# Patient Record
Sex: MALE | Race: WHITE | NOT HISPANIC OR LATINO | Employment: OTHER | URBAN - METROPOLITAN AREA
[De-identification: names, ages, dates, MRNs, and addresses within clinical notes are randomized per-mention and may not be internally consistent; named-entity substitution may affect disease eponyms.]

---

## 2023-01-24 PROBLEM — R10.9 ABDOMINAL PAIN, ACUTE: Status: ACTIVE | Noted: 2023-01-24

## 2023-01-24 PROBLEM — N39.43 BENIGN PROSTATIC HYPERPLASIA (BPH) WITH POST-VOID DRIBBLING: Status: ACTIVE | Noted: 2023-01-24

## 2023-01-24 PROBLEM — K21.9 GERD (GASTROESOPHAGEAL REFLUX DISEASE): Status: ACTIVE | Noted: 2023-01-24

## 2023-01-24 PROBLEM — E78.1 HIGH TRIGLYCERIDES: Status: ACTIVE | Noted: 2023-01-24

## 2023-01-24 PROBLEM — R09.89 CHOKING EPISODE: Status: ACTIVE | Noted: 2023-01-24

## 2023-01-24 PROBLEM — E78.5 HYPERLIPIDEMIA: Status: ACTIVE | Noted: 2023-01-24

## 2023-01-24 PROBLEM — R05.8 RECURRENT COUGH: Status: ACTIVE | Noted: 2023-01-24

## 2023-01-24 PROBLEM — R91.8 MULTIPLE PULMONARY NODULES: Status: ACTIVE | Noted: 2023-01-24
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
## 2023-01-24 PROBLEM — N40.1 BENIGN PROSTATIC HYPERPLASIA (BPH) WITH POST-VOID DRIBBLING: Status: ACTIVE | Noted: 2023-01-24

## 2023-01-24 PROBLEM — E53.8 VITAMIN B12 DEFICIENCY: Status: ACTIVE | Noted: 2023-01-24

## 2023-01-24 PROBLEM — F41.9 ANXIETY: Status: ACTIVE | Noted: 2023-01-24

## 2023-01-24 PROBLEM — E55.9 VITAMIN D DEFICIENCY: Status: ACTIVE | Noted: 2023-01-24

## 2023-01-24 PROBLEM — G60.0 CHARCOT-MARIE-TOOTH DISEASE: Status: ACTIVE | Noted: 2023-01-24

## 2023-01-24 PROBLEM — J30.81 ALLERGIC RHINITIS DUE TO ANIMALS: Status: ACTIVE | Noted: 2023-01-24

## 2023-01-24 PROBLEM — K76.0 FATTY LIVER: Status: ACTIVE | Noted: 2023-01-24

## 2023-01-24 RX ORDER — TAMSULOSIN HYDROCHLORIDE 0.4 MG/1
CAPSULE ORAL
COMMUNITY

## 2023-01-24 RX ORDER — ATORVASTATIN CALCIUM 40 MG/1
1 TABLET, FILM COATED ORAL DAILY
COMMUNITY
Start: 2013-10-22

## 2023-01-24 RX ORDER — LORAZEPAM 1 MG/1
1 TABLET ORAL 2 TIMES DAILY
COMMUNITY
Start: 2018-06-05

## 2023-01-24 RX ORDER — OMEPRAZOLE 40 MG/1
1 CAPSULE, DELAYED RELEASE ORAL DAILY
COMMUNITY
Start: 2020-08-14

## 2023-01-24 RX ORDER — PNV NO.95/FERROUS FUM/FOLIC AC 28MG-0.8MG
TABLET ORAL
COMMUNITY
Start: 2022-07-27

## 2023-01-24 RX ORDER — ACETAMINOPHEN 500 MG
TABLET ORAL
COMMUNITY
Start: 2022-07-27

## 2023-06-09 LAB
ALANINE AMINOTRANSFERASE (SGPT) (U/L) IN SER/PLAS: 35 U/L (ref 10–52)
ALBUMIN (G/DL) IN SER/PLAS: 4.7 G/DL (ref 3.4–5)
ALKALINE PHOSPHATASE (U/L) IN SER/PLAS: 69 U/L (ref 33–120)
ANION GAP IN SER/PLAS: 14 MMOL/L (ref 10–20)
ASPARTATE AMINOTRANSFERASE (SGOT) (U/L) IN SER/PLAS: 20 U/L (ref 9–39)
BASOPHILS (10*3/UL) IN BLOOD BY AUTOMATED COUNT: 0.06 X10E9/L (ref 0–0.1)
BASOPHILS/100 LEUKOCYTES IN BLOOD BY AUTOMATED COUNT: 0.8 % (ref 0–2)
BILIRUBIN TOTAL (MG/DL) IN SER/PLAS: 1.1 MG/DL (ref 0–1.2)
CALCIUM (MG/DL) IN SER/PLAS: 10.4 MG/DL (ref 8.6–10.6)
CARBON DIOXIDE, TOTAL (MMOL/L) IN SER/PLAS: 26 MMOL/L (ref 21–32)
CHLORIDE (MMOL/L) IN SER/PLAS: 106 MMOL/L (ref 98–107)
CHOLESTEROL (MG/DL) IN SER/PLAS: 150 MG/DL (ref 0–199)
CHOLESTEROL IN HDL (MG/DL) IN SER/PLAS: 45.3 MG/DL
CHOLESTEROL/HDL RATIO: 3.3
CREATININE (MG/DL) IN SER/PLAS: 0.83 MG/DL (ref 0.5–1.3)
EOSINOPHILS (10*3/UL) IN BLOOD BY AUTOMATED COUNT: 0.14 X10E9/L (ref 0–0.7)
EOSINOPHILS/100 LEUKOCYTES IN BLOOD BY AUTOMATED COUNT: 1.9 % (ref 0–6)
ERYTHROCYTE DISTRIBUTION WIDTH (RATIO) BY AUTOMATED COUNT: 13.2 % (ref 11.5–14.5)
ERYTHROCYTE MEAN CORPUSCULAR HEMOGLOBIN CONCENTRATION (G/DL) BY AUTOMATED: 33.5 G/DL (ref 32–36)
ERYTHROCYTE MEAN CORPUSCULAR VOLUME (FL) BY AUTOMATED COUNT: 85 FL (ref 80–100)
ERYTHROCYTES (10*6/UL) IN BLOOD BY AUTOMATED COUNT: 5.95 X10E12/L (ref 4.5–5.9)
GFR MALE: >90 ML/MIN/1.73M2
GLUCOSE (MG/DL) IN SER/PLAS: 110 MG/DL (ref 74–99)
HEMATOCRIT (%) IN BLOOD BY AUTOMATED COUNT: 50.8 % (ref 41–52)
HEMOGLOBIN (G/DL) IN BLOOD: 17 G/DL (ref 13.5–17.5)
IMMATURE GRANULOCYTES/100 LEUKOCYTES IN BLOOD BY AUTOMATED COUNT: 1.2 % (ref 0–0.9)
LDL: 64 MG/DL (ref 0–99)
LEUKOCYTES (10*3/UL) IN BLOOD BY AUTOMATED COUNT: 7.4 X10E9/L (ref 4.4–11.3)
LYMPHOCYTES (10*3/UL) IN BLOOD BY AUTOMATED COUNT: 2.35 X10E9/L (ref 1.2–4.8)
LYMPHOCYTES/100 LEUKOCYTES IN BLOOD BY AUTOMATED COUNT: 31.8 % (ref 13–44)
MONOCYTES (10*3/UL) IN BLOOD BY AUTOMATED COUNT: 0.51 X10E9/L (ref 0.1–1)
MONOCYTES/100 LEUKOCYTES IN BLOOD BY AUTOMATED COUNT: 6.9 % (ref 2–10)
NEUTROPHILS (10*3/UL) IN BLOOD BY AUTOMATED COUNT: 4.23 X10E9/L (ref 1.2–7.7)
NEUTROPHILS/100 LEUKOCYTES IN BLOOD BY AUTOMATED COUNT: 57.4 % (ref 40–80)
NON HDL CHOLESTEROL: 105 MG/DL
NRBC (PER 100 WBCS) BY AUTOMATED COUNT: 0 /100 WBC (ref 0–0)
PLATELETS (10*3/UL) IN BLOOD AUTOMATED COUNT: 202 X10E9/L (ref 150–450)
POTASSIUM (MMOL/L) IN SER/PLAS: 4.4 MMOL/L (ref 3.5–5.3)
PROSTATE SPECIFIC AG (NG/ML) IN SER/PLAS: 3.04 NG/ML (ref 0–4)
PROTEIN TOTAL: 6.9 G/DL (ref 6.4–8.2)
SODIUM (MMOL/L) IN SER/PLAS: 142 MMOL/L (ref 136–145)
THYROTROPIN (MIU/L) IN SER/PLAS BY DETECTION LIMIT <= 0.05 MIU/L: 1.19 MIU/L (ref 0.44–3.98)
TRIGLYCERIDE (MG/DL) IN SER/PLAS: 206 MG/DL (ref 0–149)
UREA NITROGEN (MG/DL) IN SER/PLAS: 19 MG/DL (ref 6–23)
VLDL: 41 MG/DL (ref 0–40)

## 2023-06-13 LAB
ESTIMATED AVERAGE GLUCOSE FOR HBA1C: 114 MG/DL
HEMOGLOBIN A1C/HEMOGLOBIN TOTAL IN BLOOD: 5.6 %

## 2023-06-30 RX ORDER — OMEPRAZOLE 40 MG/1
CAPSULE, DELAYED RELEASE ORAL
Qty: 90 CAPSULE | Refills: 3 | OUTPATIENT
Start: 2023-06-30

## 2024-03-25 ENCOUNTER — LAB (OUTPATIENT)
Dept: LAB | Facility: LAB | Age: 59
End: 2024-03-25
Payer: COMMERCIAL

## 2024-03-25 DIAGNOSIS — R53.83 OTHER FATIGUE: ICD-10-CM

## 2024-03-25 DIAGNOSIS — R35.0 FREQUENCY OF MICTURITION: ICD-10-CM

## 2024-03-25 DIAGNOSIS — R73.01 IMPAIRED FASTING GLUCOSE: ICD-10-CM

## 2024-03-25 DIAGNOSIS — Z00.00 ENCOUNTER FOR GENERAL ADULT MEDICAL EXAMINATION WITHOUT ABNORMAL FINDINGS: Primary | ICD-10-CM

## 2024-03-25 LAB
ALBUMIN SERPL BCP-MCNC: 4.5 G/DL (ref 3.4–5)
ALP SERPL-CCNC: 76 U/L (ref 33–120)
ALT SERPL W P-5'-P-CCNC: 30 U/L (ref 10–52)
ANION GAP SERPL CALC-SCNC: 14 MMOL/L (ref 10–20)
AST SERPL W P-5'-P-CCNC: 20 U/L (ref 9–39)
BASOPHILS # BLD AUTO: 0.08 X10*3/UL (ref 0–0.1)
BASOPHILS NFR BLD AUTO: 0.8 %
BILIRUB SERPL-MCNC: 1.2 MG/DL (ref 0–1.2)
BUN SERPL-MCNC: 16 MG/DL (ref 6–23)
CALCIUM SERPL-MCNC: 10.4 MG/DL (ref 8.6–10.6)
CHLORIDE SERPL-SCNC: 105 MMOL/L (ref 98–107)
CHOLEST SERPL-MCNC: 142 MG/DL (ref 0–199)
CHOLESTEROL/HDL RATIO: 2.7
CO2 SERPL-SCNC: 26 MMOL/L (ref 21–32)
CREAT SERPL-MCNC: 0.88 MG/DL (ref 0.5–1.3)
CRP SERPL-MCNC: <0.1 MG/DL
EGFRCR SERPLBLD CKD-EPI 2021: >90 ML/MIN/1.73M*2
EOSINOPHIL # BLD AUTO: 0.07 X10*3/UL (ref 0–0.7)
EOSINOPHIL NFR BLD AUTO: 0.7 %
ERYTHROCYTE [DISTWIDTH] IN BLOOD BY AUTOMATED COUNT: 12.9 % (ref 11.5–14.5)
ERYTHROCYTE [SEDIMENTATION RATE] IN BLOOD BY WESTERGREN METHOD: 5 MM/H (ref 0–20)
EST. AVERAGE GLUCOSE BLD GHB EST-MCNC: 111 MG/DL
GLUCOSE SERPL-MCNC: 107 MG/DL (ref 74–99)
HBA1C MFR BLD: 5.5 %
HCT VFR BLD AUTO: 51.2 % (ref 41–52)
HDLC SERPL-MCNC: 51.7 MG/DL
HGB BLD-MCNC: 17 G/DL (ref 13.5–17.5)
IMM GRANULOCYTES # BLD AUTO: 0.09 X10*3/UL (ref 0–0.7)
IMM GRANULOCYTES NFR BLD AUTO: 0.9 % (ref 0–0.9)
LDH SERPL L TO P-CCNC: 159 U/L (ref 84–246)
LDLC SERPL CALC-MCNC: 67 MG/DL
LYMPHOCYTES # BLD AUTO: 2.28 X10*3/UL (ref 1.2–4.8)
LYMPHOCYTES NFR BLD AUTO: 23.4 %
MCH RBC QN AUTO: 28.6 PG (ref 26–34)
MCHC RBC AUTO-ENTMCNC: 33.2 G/DL (ref 32–36)
MCV RBC AUTO: 86 FL (ref 80–100)
MONOCYTES # BLD AUTO: 0.58 X10*3/UL (ref 0.1–1)
MONOCYTES NFR BLD AUTO: 5.9 %
NEUTROPHILS # BLD AUTO: 6.65 X10*3/UL (ref 1.2–7.7)
NEUTROPHILS NFR BLD AUTO: 68.3 %
NON HDL CHOLESTEROL: 90 MG/DL (ref 0–149)
NRBC BLD-RTO: 0 /100 WBCS (ref 0–0)
PLATELET # BLD AUTO: 238 X10*3/UL (ref 150–450)
POTASSIUM SERPL-SCNC: 4.6 MMOL/L (ref 3.5–5.3)
PROT SERPL-MCNC: 6.6 G/DL (ref 6.4–8.2)
RBC # BLD AUTO: 5.94 X10*6/UL (ref 4.5–5.9)
SODIUM SERPL-SCNC: 140 MMOL/L (ref 136–145)
TRIGL SERPL-MCNC: 116 MG/DL (ref 0–149)
TSH SERPL-ACNC: 1.15 MIU/L (ref 0.44–3.98)
VLDL: 23 MG/DL (ref 0–40)
WBC # BLD AUTO: 9.8 X10*3/UL (ref 4.4–11.3)

## 2024-03-25 PROCEDURE — 85652 RBC SED RATE AUTOMATED: CPT

## 2024-03-25 PROCEDURE — 36415 COLL VENOUS BLD VENIPUNCTURE: CPT

## 2024-03-25 PROCEDURE — 80061 LIPID PANEL: CPT

## 2024-03-25 PROCEDURE — 86140 C-REACTIVE PROTEIN: CPT

## 2024-03-25 PROCEDURE — 80053 COMPREHEN METABOLIC PANEL: CPT

## 2024-03-25 PROCEDURE — 84443 ASSAY THYROID STIM HORMONE: CPT

## 2024-03-25 PROCEDURE — 83615 LACTATE (LD) (LDH) ENZYME: CPT

## 2024-03-25 PROCEDURE — 84153 ASSAY OF PSA TOTAL: CPT

## 2024-03-25 PROCEDURE — 85025 COMPLETE CBC W/AUTO DIFF WBC: CPT

## 2024-03-25 PROCEDURE — 84154 ASSAY OF PSA FREE: CPT

## 2024-03-25 PROCEDURE — 83036 HEMOGLOBIN GLYCOSYLATED A1C: CPT

## 2024-03-26 ENCOUNTER — HOSPITAL ENCOUNTER (OUTPATIENT)
Dept: RADIOLOGY | Facility: CLINIC | Age: 59
Discharge: HOME | End: 2024-03-26
Payer: COMMERCIAL

## 2024-03-26 DIAGNOSIS — R35.0 FREQUENCY OF MICTURITION: ICD-10-CM

## 2024-03-26 PROCEDURE — 76770 US EXAM ABDO BACK WALL COMP: CPT

## 2024-03-26 PROCEDURE — 76775 US EXAM ABDO BACK WALL LIM: CPT | Performed by: RADIOLOGY

## 2024-03-27 LAB
PSA FREE MFR SERPL: 31 %
PSA FREE SERPL-MCNC: 1.9 NG/ML
PSA SERPL IA-MCNC: 6.1 NG/ML (ref 0–4)

## 2024-05-02 ENCOUNTER — OFFICE VISIT (OUTPATIENT)
Dept: UROLOGY | Facility: HOSPITAL | Age: 59
End: 2024-05-02
Payer: COMMERCIAL

## 2024-05-02 DIAGNOSIS — N39.43 BENIGN PROSTATIC HYPERPLASIA (BPH) WITH POST-VOID DRIBBLING: Primary | ICD-10-CM

## 2024-05-02 DIAGNOSIS — N40.1 BENIGN PROSTATIC HYPERPLASIA (BPH) WITH POST-VOID DRIBBLING: Primary | ICD-10-CM

## 2024-05-02 DIAGNOSIS — R97.20 ELEVATED PSA: ICD-10-CM

## 2024-05-02 DIAGNOSIS — Z87.440 HISTORY OF UTI: ICD-10-CM

## 2024-05-02 PROCEDURE — 51798 US URINE CAPACITY MEASURE: CPT | Performed by: UROLOGY

## 2024-05-02 PROCEDURE — 1036F TOBACCO NON-USER: CPT | Performed by: UROLOGY

## 2024-05-02 PROCEDURE — 99214 OFFICE O/P EST MOD 30 MIN: CPT | Performed by: UROLOGY

## 2024-05-02 PROCEDURE — 99204 OFFICE O/P NEW MOD 45 MIN: CPT | Performed by: UROLOGY

## 2024-05-02 RX ORDER — TADALAFIL 5 MG/1
5 TABLET ORAL DAILY
Qty: 30 TABLET | Refills: 11 | Status: SHIPPED | OUTPATIENT
Start: 2024-05-02 | End: 2025-05-02

## 2024-05-02 NOTE — PROGRESS NOTES
HPI    59 y.o. male being seen with the following problem list:    Problem list:  Bothersome LUTS  History of UTI  Elevated PSA    Previously seen by Dr. Diez in [place] for weak stream, intermittency    05/02/24 - PVR today 6cc. Was on flomax, then finasteride, didn't tolerate either one. Now not doing too bad from a urinary standpoint. Had a UTI early in 2024. Started finasteride around 2/2024, was on it for about 1 mo    Prostate Cancer Screening:   No family history of prostate cancer, No prior biopsy, and No prior MRI    Lab Results   Component Value Date    PSA 6.1 (H) 03/25/2024    PSA 3.04 06/08/2023    PSA 2.82 11/03/2021    PSA 2.01 12/03/2019         Current Medications:  Current Outpatient Medications   Medication Sig Dispense Refill    atorvastatin (Lipitor) 40 mg tablet Take 1 tablet (40 mg) by mouth 1 (one) time each day.      cholecalciferol (Vitamin D-3) 5,000 Units tablet Vitamin D3 125 MCG (5000 UT) Oral Tablet   Refills: 0        Start : 27-Jul-2022   Active      cyanocobalamin (Vitamin B-12) 100 mcg tablet Vitamin B12 100 MCG Oral Tablet   Refills: 0        Start : 27-Jul-2022   Active      LORazepam (Ativan) 1 mg tablet Take 1 tablet (1 mg) by mouth in the morning and at bedtime. For anxiety      omeprazole (PriLOSEC) 40 mg DR capsule Take 1 capsule (40 mg) by mouth 1 (one) time each day.      tamsulosin (Flomax) 0.4 mg 24 hr capsule        No current facility-administered medications for this visit.        Active Problems:  Oswald Burton is a 59 y.o. male with the following Problems and Medications.  Patient Active Problem List   Diagnosis    Allergic rhinitis due to animals    Abdominal pain, acute    Anxiety    Benign prostatic hyperplasia (BPH) with post-void dribbling    Charcot-Kimberly-Tooth disease    Choking episode    Fatty liver    GERD (gastroesophageal reflux disease)    High triglycerides    Hyperlipidemia    Multiple pulmonary nodules    Recurrent cough    Tick bite    Vitamin  B12 deficiency    Vitamin D deficiency     Current Outpatient Medications   Medication Sig Dispense Refill    atorvastatin (Lipitor) 40 mg tablet Take 1 tablet (40 mg) by mouth 1 (one) time each day.      cholecalciferol (Vitamin D-3) 5,000 Units tablet Vitamin D3 125 MCG (5000 UT) Oral Tablet   Refills: 0        Start : 27-Jul-2022   Active      cyanocobalamin (Vitamin B-12) 100 mcg tablet Vitamin B12 100 MCG Oral Tablet   Refills: 0        Start : 27-Jul-2022   Active      LORazepam (Ativan) 1 mg tablet Take 1 tablet (1 mg) by mouth in the morning and at bedtime. For anxiety      omeprazole (PriLOSEC) 40 mg DR capsule Take 1 capsule (40 mg) by mouth 1 (one) time each day.      tamsulosin (Flomax) 0.4 mg 24 hr capsule        No current facility-administered medications for this visit.       PMH:  Past Medical History:   Diagnosis Date    Acquired absence of other specified parts of digestive tract     History of colon resection    Low back pain, unspecified 10/13/2013    Lumbago    Mixed hyperlipidemia 03/12/2014    Mixed hyperlipidemia    Other specified postprocedural states     History of colonoscopy    Personal history of other diseases of male genital organs     History of chronic prostatitis    Personal history of other mental and behavioral disorders     History of depression    Psychophysiologic insomnia     Chronic insomnia    Urticaria, unspecified 10/13/2013    Urticaria       PSH:  Past Surgical History:   Procedure Laterality Date    CHOLECYSTECTOMY  02/17/2014    Cholecystectomy    COLON SURGERY  03/26/2014    Colon Surgery    HEMICOLECTOMY  03/26/2014    Hemicolectomy    HERNIA REPAIR  02/17/2014    Hernia Repair    KNEE SURGERY  03/26/2014    Knee Surgery    OTHER SURGICAL HISTORY  02/17/2014    History Of Prior Surgery    TONSILLECTOMY  02/17/2014    Tonsillectomy       FMH:  Family History   Problem Relation Name Age of Onset    Colon cancer Mother      Colonic polyp Father      Depression Father       Prostate cancer Father      Lymphoma Other Parental Uncle     Lymphoma Sibling         SHx:  Social History     Tobacco Use    Smoking status: Never    Smokeless tobacco: Never       Allergies:  No Known Allergies      Assessment/Plan  Bothersome urinary symptoms, now reasonly well-controlled, but with a recent UTI.  Recommended we do something different as had a UTI and this would warrant some change in management.  For now, he like to continue as he is with observation.  Discussed daily tadalafil as an option but will be different when he already has tried, but for now would like to continue off the medication.    Discussed his elevated PSA.  Warrants recheck.  Sounds like this was done around the time of his infection.  Will check in next couple weeks and over telehealth to review the results.  Given his history of Proscar, I am uncertain of what his PSA changes will be, though has been off for about a month.  Will keep this in mind as we evaluate his upcoming PSA.    Fu 3 weeks with PSA prior      Scribe Attestation  By signing my name below, I, Gabby Morrow, attest that this documentation  has been prepared under the direction and in the presence of Burke Fu MD.

## 2024-05-10 ENCOUNTER — LAB (OUTPATIENT)
Dept: LAB | Facility: LAB | Age: 59
End: 2024-05-10
Payer: COMMERCIAL

## 2024-05-10 DIAGNOSIS — N40.1 BENIGN PROSTATIC HYPERPLASIA WITH LOWER URINARY TRACT SYMPTOMS: Primary | ICD-10-CM

## 2024-05-10 PROCEDURE — 36415 COLL VENOUS BLD VENIPUNCTURE: CPT

## 2024-05-10 PROCEDURE — 84154 ASSAY OF PSA FREE: CPT

## 2024-05-10 PROCEDURE — 84153 ASSAY OF PSA TOTAL: CPT

## 2024-05-11 LAB
PSA FREE MFR SERPL: 24 %
PSA FREE SERPL-MCNC: 0.8 NG/ML
PSA SERPL IA-MCNC: 3.4 NG/ML (ref 0–4)

## 2024-05-22 ENCOUNTER — TELEMEDICINE (OUTPATIENT)
Dept: UROLOGY | Facility: HOSPITAL | Age: 59
End: 2024-05-22
Payer: COMMERCIAL

## 2024-05-22 DIAGNOSIS — Z87.440 HISTORY OF UTI: ICD-10-CM

## 2024-05-22 DIAGNOSIS — N40.1 BENIGN PROSTATIC HYPERPLASIA (BPH) WITH POST-VOID DRIBBLING: ICD-10-CM

## 2024-05-22 DIAGNOSIS — R97.20 ELEVATED PSA: Primary | ICD-10-CM

## 2024-05-22 DIAGNOSIS — N39.43 BENIGN PROSTATIC HYPERPLASIA (BPH) WITH POST-VOID DRIBBLING: ICD-10-CM

## 2024-05-22 PROCEDURE — 99213 OFFICE O/P EST LOW 20 MIN: CPT | Performed by: UROLOGY

## 2024-05-22 NOTE — PROGRESS NOTES
HPI    59 y.o. male being seen with the following problem list:    Problem list:  Bothersome LUTS  History of UTI  Elevated PSA    Previously seen by Dr. Diez in [place] for weak stream, intermittency     05/02/24 - PVR today 6cc. Was on flomax, then finasteride, didn't tolerate either one. Now not doing too bad from a urinary standpoint. Had a UTI early in 2024. Started finasteride around 2/2024, was on it for about 1 mo     Prostate Cancer Screening:   No family history of prostate cancer, No prior biopsy, and No prior MRI    05/22/24 - seen to review interval PSA. LUTS are fairly ok, does not want to do anything different at this time.      Lab Results   Component Value Date    PSA 3.4 05/10/2024    PSA 6.1 (H) 03/25/2024    PSA 3.04 06/08/2023    PSA 2.82 11/03/2021    PSA 2.01 12/03/2019         Current Medications:  Current Outpatient Medications   Medication Sig Dispense Refill    atorvastatin (Lipitor) 40 mg tablet Take 1 tablet (40 mg) by mouth 1 (one) time each day.      cholecalciferol (Vitamin D-3) 5,000 Units tablet Vitamin D3 125 MCG (5000 UT) Oral Tablet   Refills: 0        Start : 27-Jul-2022   Active      cyanocobalamin (Vitamin B-12) 100 mcg tablet Vitamin B12 100 MCG Oral Tablet   Refills: 0        Start : 27-Jul-2022   Active      LORazepam (Ativan) 1 mg tablet Take 1 tablet (1 mg) by mouth in the morning and at bedtime. For anxiety      omeprazole (PriLOSEC) 40 mg DR capsule Take 1 capsule (40 mg) by mouth 1 (one) time each day.      tadalafil (Cialis) 5 mg tablet Take 1 tablet (5 mg) by mouth once daily. 30 tablet 11    tamsulosin (Flomax) 0.4 mg 24 hr capsule        No current facility-administered medications for this visit.        Active Problems:  Oswald Burton is a 59 y.o. male with the following Problems and Medications.  Patient Active Problem List   Diagnosis    Allergic rhinitis due to animals    Abdominal pain, acute    Anxiety    Benign prostatic hyperplasia (BPH) with  post-void dribbling    Charcot-Kimberly-Tooth disease    Choking episode    Fatty liver    GERD (gastroesophageal reflux disease)    High triglycerides    Hyperlipidemia    Multiple pulmonary nodules    Recurrent cough    Tick bite    Vitamin B12 deficiency    Vitamin D deficiency    Elevated PSA    History of UTI     Current Outpatient Medications   Medication Sig Dispense Refill    atorvastatin (Lipitor) 40 mg tablet Take 1 tablet (40 mg) by mouth 1 (one) time each day.      cholecalciferol (Vitamin D-3) 5,000 Units tablet Vitamin D3 125 MCG (5000 UT) Oral Tablet   Refills: 0        Start : 27-Jul-2022   Active      cyanocobalamin (Vitamin B-12) 100 mcg tablet Vitamin B12 100 MCG Oral Tablet   Refills: 0        Start : 27-Jul-2022   Active      LORazepam (Ativan) 1 mg tablet Take 1 tablet (1 mg) by mouth in the morning and at bedtime. For anxiety      omeprazole (PriLOSEC) 40 mg DR capsule Take 1 capsule (40 mg) by mouth 1 (one) time each day.      tadalafil (Cialis) 5 mg tablet Take 1 tablet (5 mg) by mouth once daily. 30 tablet 11    tamsulosin (Flomax) 0.4 mg 24 hr capsule        No current facility-administered medications for this visit.       PMH:  Past Medical History:   Diagnosis Date    Acquired absence of other specified parts of digestive tract     History of colon resection    Low back pain, unspecified 10/13/2013    Lumbago    Mixed hyperlipidemia 03/12/2014    Mixed hyperlipidemia    Other specified postprocedural states     History of colonoscopy    Personal history of other diseases of male genital organs     History of chronic prostatitis    Personal history of other mental and behavioral disorders     History of depression    Psychophysiologic insomnia     Chronic insomnia    Urticaria, unspecified 10/13/2013    Urticaria       PSH:  Past Surgical History:   Procedure Laterality Date    CHOLECYSTECTOMY  02/17/2014    Cholecystectomy    COLON SURGERY  03/26/2014    Colon Surgery    HEMICOLECTOMY   03/26/2014    Hemicolectomy    HERNIA REPAIR  02/17/2014    Hernia Repair    KNEE SURGERY  03/26/2014    Knee Surgery    OTHER SURGICAL HISTORY  02/17/2014    History Of Prior Surgery    TONSILLECTOMY  02/17/2014    Tonsillectomy       FMH:  Family History   Problem Relation Name Age of Onset    Colon cancer Mother      Colonic polyp Father      Depression Father      Prostate cancer Father      Lymphoma Other Parental Uncle     Lymphoma Sibling         SHx:  Social History     Tobacco Use    Smoking status: Never    Smokeless tobacco: Never       Allergies:  No Known Allergies    Assessment/Plan  PSA back down to baseline, would recheck in 6 mo.    LUTS are stable, off meds, would like to continue with current management.    Fu 6 mo over THV with PSA prior        Scribe Attestation  By signing my name below, I, Gabby Morrow, attest that this documentation  has been prepared under the direction and in the presence of Burke Fu MD.

## 2024-11-18 ENCOUNTER — LAB (OUTPATIENT)
Dept: LAB | Facility: LAB | Age: 59
End: 2024-11-18
Payer: COMMERCIAL

## 2024-11-18 DIAGNOSIS — R97.20 ELEVATED PSA: ICD-10-CM

## 2024-11-18 PROCEDURE — 36415 COLL VENOUS BLD VENIPUNCTURE: CPT

## 2024-11-18 PROCEDURE — 84153 ASSAY OF PSA TOTAL: CPT

## 2024-11-19 LAB — PSA SERPL-MCNC: 4.64 NG/ML

## 2024-11-20 ENCOUNTER — APPOINTMENT (OUTPATIENT)
Dept: UROLOGY | Facility: HOSPITAL | Age: 59
End: 2024-11-20
Payer: COMMERCIAL

## 2024-11-21 NOTE — PROGRESS NOTES
HPI    59 y.o. male being seen with the following problem list:    Problem list:  Bothersome LUTS  History of UTI  Elevated PSA     Previously seen by Dr. Diez in [place] for weak stream, intermittency     05/02/24 - PVR today 6cc. Was on flomax, then finasteride, didn't tolerate either one. Now not doing too bad from a urinary standpoint. Had a UTI early in 2024. Started finasteride around 2/2024, was on it for about 1 mo     Prostate Cancer Screening:   No family history of prostate cancer, No prior biopsy, and No prior MRI     05/22/24 - seen to review interval PSA. LUTS are fairly ok off meds, does not want to do anything different at this time.    11/22/24 - Seen today over telehealth, performed this visit using real-time telehealth tools, including an audio/video connection between Oswald Burton at home and Burke Fu MD at [facility]. Consent for telemedicine visit was obtained.  Urinary symptoms fluctuate, nocturia 1-2x, feels like he is emptying well. Would like to try daily tadalafil        Lab Results   Component Value Date    PSA 4.64 (H) 11/18/2024    PSA 3.4 05/10/2024    PSA 6.1 (H) 03/25/2024    PSA 3.04 06/08/2023    PSA 2.82 11/03/2021              Current Medications:  Current Outpatient Medications   Medication Sig Dispense Refill    atorvastatin (Lipitor) 40 mg tablet Take 1 tablet (40 mg) by mouth 1 (one) time each day.      cholecalciferol (Vitamin D-3) 5,000 Units tablet Vitamin D3 125 MCG (5000 UT) Oral Tablet   Refills: 0        Start : 27-Jul-2022   Active      cyanocobalamin (Vitamin B-12) 100 mcg tablet Vitamin B12 100 MCG Oral Tablet   Refills: 0        Start : 27-Jul-2022   Active      LORazepam (Ativan) 1 mg tablet Take 1 tablet (1 mg) by mouth in the morning and at bedtime. For anxiety      omeprazole (PriLOSEC) 40 mg DR capsule Take 1 capsule (40 mg) by mouth 1 (one) time each day.      tadalafil (Cialis) 5 mg tablet Take 1 tablet (5 mg) by mouth once daily. 30  tablet 11     No current facility-administered medications for this visit.        Active Problems:  Oswald Burton is a 59 y.o. male with the following Problems and Medications.  Patient Active Problem List   Diagnosis    Allergic rhinitis due to animals    Abdominal pain, acute    Anxiety    Benign prostatic hyperplasia (BPH) with post-void dribbling    Charcot-Kimberly-Tooth disease    Choking episode    Fatty liver    GERD (gastroesophageal reflux disease)    High triglycerides    Hyperlipidemia    Multiple pulmonary nodules    Recurrent cough    Tick bite    Vitamin B12 deficiency    Vitamin D deficiency    Elevated PSA    History of UTI     Current Outpatient Medications   Medication Sig Dispense Refill    atorvastatin (Lipitor) 40 mg tablet Take 1 tablet (40 mg) by mouth 1 (one) time each day.      cholecalciferol (Vitamin D-3) 5,000 Units tablet Vitamin D3 125 MCG (5000 UT) Oral Tablet   Refills: 0        Start : 27-Jul-2022   Active      cyanocobalamin (Vitamin B-12) 100 mcg tablet Vitamin B12 100 MCG Oral Tablet   Refills: 0        Start : 27-Jul-2022   Active      LORazepam (Ativan) 1 mg tablet Take 1 tablet (1 mg) by mouth in the morning and at bedtime. For anxiety      omeprazole (PriLOSEC) 40 mg DR capsule Take 1 capsule (40 mg) by mouth 1 (one) time each day.      tadalafil (Cialis) 5 mg tablet Take 1 tablet (5 mg) by mouth once daily. 30 tablet 11     No current facility-administered medications for this visit.       PMH:  Past Medical History:   Diagnosis Date    Acquired absence of other specified parts of digestive tract     History of colon resection    Low back pain, unspecified 10/13/2013    Lumbago    Mixed hyperlipidemia 03/12/2014    Mixed hyperlipidemia    Other specified postprocedural states     History of colonoscopy    Personal history of other diseases of male genital organs     History of chronic prostatitis    Personal history of other mental and behavioral disorders     History of  depression    Psychophysiologic insomnia     Chronic insomnia    Urticaria, unspecified 10/13/2013    Urticaria       PSH:  Past Surgical History:   Procedure Laterality Date    CHOLECYSTECTOMY  02/17/2014    Cholecystectomy    COLON SURGERY  03/26/2014    Colon Surgery    HEMICOLECTOMY  03/26/2014    Hemicolectomy    HERNIA REPAIR  02/17/2014    Hernia Repair    KNEE SURGERY  03/26/2014    Knee Surgery    OTHER SURGICAL HISTORY  02/17/2014    History Of Prior Surgery    TONSILLECTOMY  02/17/2014    Tonsillectomy       FMH:  Family History   Problem Relation Name Age of Onset    Colon cancer Mother      Colonic polyp Father      Depression Father      Prostate cancer Father      Lymphoma Other Parental Uncle     Lymphoma Sibling         SHx:  Social History     Tobacco Use    Smoking status: Never    Smokeless tobacco: Never       Allergies:  No Known Allergies      Assessment/Plan  Doing well overall. Urinary symptoms fluctuate, nocturia 1-2x, feels like he is emptying well. Offered continuing living this way vs surgery vs different medication. He would like to try Cialis 5mg for BPH. Will order this for him.      Reviewed his PSA trend, fluctuating, recently elevated. I recommend re-checking in 6 weeks.     Follow up in 6 weeks over .    Gabby Attestation  By signing my name below, I, Gabby Arevalo, attest that this documentation has been prepared under the direction and in the presence of Burke Fu MD.

## 2024-11-22 ENCOUNTER — TELEMEDICINE (OUTPATIENT)
Dept: UROLOGY | Facility: HOSPITAL | Age: 59
End: 2024-11-22
Payer: COMMERCIAL

## 2024-11-22 ENCOUNTER — APPOINTMENT (OUTPATIENT)
Dept: UROLOGY | Facility: HOSPITAL | Age: 59
End: 2024-11-22
Payer: COMMERCIAL

## 2024-11-22 DIAGNOSIS — N39.43 BENIGN PROSTATIC HYPERPLASIA (BPH) WITH POST-VOID DRIBBLING: ICD-10-CM

## 2024-11-22 DIAGNOSIS — R39.11 URINARY HESITANCY: ICD-10-CM

## 2024-11-22 DIAGNOSIS — N40.1 BENIGN PROSTATIC HYPERPLASIA (BPH) WITH POST-VOID DRIBBLING: ICD-10-CM

## 2024-11-22 DIAGNOSIS — R97.20 ELEVATED PSA: Primary | ICD-10-CM

## 2024-11-22 PROCEDURE — 99214 OFFICE O/P EST MOD 30 MIN: CPT | Performed by: UROLOGY

## 2024-11-22 PROCEDURE — G2211 COMPLEX E/M VISIT ADD ON: HCPCS | Performed by: UROLOGY

## 2024-12-27 ENCOUNTER — LAB (OUTPATIENT)
Dept: LAB | Facility: LAB | Age: 59
End: 2024-12-27
Payer: COMMERCIAL

## 2024-12-27 DIAGNOSIS — R97.20 ELEVATED PSA: ICD-10-CM

## 2024-12-27 LAB — PSA SERPL-MCNC: 4.29 NG/ML

## 2024-12-27 PROCEDURE — 84153 ASSAY OF PSA TOTAL: CPT

## 2025-01-03 DIAGNOSIS — R97.20 ELEVATED PSA: ICD-10-CM

## 2025-01-06 ENCOUNTER — HOSPITAL ENCOUNTER (OUTPATIENT)
Dept: RADIOLOGY | Facility: HOSPITAL | Age: 60
Discharge: HOME | End: 2025-01-06

## 2025-01-06 DIAGNOSIS — R97.20 ELEVATED PSA: ICD-10-CM

## 2025-01-06 PROCEDURE — 6100000002 MR PROSTATE SCREENING SELF PAY EXAM

## 2025-01-08 ENCOUNTER — TELEMEDICINE (OUTPATIENT)
Dept: UROLOGY | Facility: HOSPITAL | Age: 60
End: 2025-01-08
Payer: COMMERCIAL

## 2025-01-08 DIAGNOSIS — R97.20 ELEVATED PSA: ICD-10-CM

## 2025-01-08 DIAGNOSIS — N40.1 BENIGN PROSTATIC HYPERPLASIA (BPH) WITH POST-VOID DRIBBLING: Primary | ICD-10-CM

## 2025-01-08 DIAGNOSIS — N39.43 BENIGN PROSTATIC HYPERPLASIA (BPH) WITH POST-VOID DRIBBLING: Primary | ICD-10-CM

## 2025-01-08 PROCEDURE — 99213 OFFICE O/P EST LOW 20 MIN: CPT | Performed by: UROLOGY

## 2025-01-08 PROCEDURE — G2211 COMPLEX E/M VISIT ADD ON: HCPCS | Performed by: UROLOGY

## 2025-01-08 NOTE — PROGRESS NOTES
HPI    59 y.o. male being seen with the following problem list:    Problem list:  Bothersome LUTS  History of UTI  Elevated PSA     Previously seen by Dr. Diez in [place] for weak stream, intermittency     05/02/24 - PVR today 6cc. Was on flomax, then finasteride, didn't tolerate either one. Now not doing too bad from a urinary standpoint. Had a UTI early in 2024. Started finasteride around 2/2024, was on it for about 1 mo     Prostate Cancer Screening:   No family history of prostate cancer, No prior biopsy, and No prior MRI     05/22/24 - seen to review interval PSA. LUTS are fairly ok off meds, does not want to do anything different at this time.     11/22/24 - Seen today over telehealth, performed this visit using real-time telehealth tools, including an audio/video connection between Oswald Burton at home and Burke Fu MD at [facility]. Consent for telemedicine visit was obtained.  Urinary symptoms fluctuate, nocturia 1-2x, feels like he is emptying well. Would like to try daily tadalafil for BPH.     1/6/25 MRI prostate - PiRADS 2 lesion. PSA density is 0.05 ng/mL/g. Prostate vol 93g.    01/08/25 - Seen today over telehealth, performed this visit using real-time telehealth tools, including an audio/video connection between Oswald Burton at home and Burke Fu MD at [facility]. Consent for telemedicine visit was obtained.   Doing well, here to review MRI. Has been experiencing pain/discomfort in his groin and in his testicles. Mild urinary symptoms, not bothersome at this point. Father had PCa in his 70s.      Lab Results   Component Value Date    PSA 4.29 (H) 12/27/2024    PSA 4.64 (H) 11/18/2024    PSA 3.4 05/10/2024    PSA 6.1 (H) 03/25/2024    PSA 3.04 06/08/2023              Current Medications:  Current Outpatient Medications   Medication Sig Dispense Refill    atorvastatin (Lipitor) 40 mg tablet Take 1 tablet (40 mg) by mouth 1 (one) time each day.       cholecalciferol (Vitamin D-3) 5,000 Units tablet Vitamin D3 125 MCG (5000 UT) Oral Tablet   Refills: 0        Start : 27-Jul-2022   Active      cyanocobalamin (Vitamin B-12) 100 mcg tablet Vitamin B12 100 MCG Oral Tablet   Refills: 0        Start : 27-Jul-2022   Active      LORazepam (Ativan) 1 mg tablet Take 1 tablet (1 mg) by mouth in the morning and at bedtime. For anxiety      omeprazole (PriLOSEC) 40 mg DR capsule Take 1 capsule (40 mg) by mouth 1 (one) time each day.      tadalafil (Cialis) 5 mg tablet Take 1 tablet (5 mg) by mouth once daily. 30 tablet 11     No current facility-administered medications for this visit.        Active Problems:  Oswald Burton is a 59 y.o. male with the following Problems and Medications.  Patient Active Problem List   Diagnosis    Allergic rhinitis due to animals    Abdominal pain, acute    Anxiety    Benign prostatic hyperplasia (BPH) with post-void dribbling    Charcot-Kimberly-Tooth disease    Choking episode    Fatty liver    GERD (gastroesophageal reflux disease)    High triglycerides    Hyperlipidemia    Multiple pulmonary nodules    Recurrent cough    Tick bite    Vitamin B12 deficiency    Vitamin D deficiency    Elevated PSA    History of UTI    Urinary hesitancy     Current Outpatient Medications   Medication Sig Dispense Refill    atorvastatin (Lipitor) 40 mg tablet Take 1 tablet (40 mg) by mouth 1 (one) time each day.      cholecalciferol (Vitamin D-3) 5,000 Units tablet Vitamin D3 125 MCG (5000 UT) Oral Tablet   Refills: 0        Start : 27-Jul-2022   Active      cyanocobalamin (Vitamin B-12) 100 mcg tablet Vitamin B12 100 MCG Oral Tablet   Refills: 0        Start : 27-Jul-2022   Active      LORazepam (Ativan) 1 mg tablet Take 1 tablet (1 mg) by mouth in the morning and at bedtime. For anxiety      omeprazole (PriLOSEC) 40 mg DR capsule Take 1 capsule (40 mg) by mouth 1 (one) time each day.      tadalafil (Cialis) 5 mg tablet Take 1 tablet (5 mg) by mouth once  daily. 30 tablet 11     No current facility-administered medications for this visit.       PMH:  Past Medical History:   Diagnosis Date    Acquired absence of other specified parts of digestive tract     History of colon resection    Low back pain, unspecified 10/13/2013    Lumbago    Mixed hyperlipidemia 03/12/2014    Mixed hyperlipidemia    Other specified postprocedural states     History of colonoscopy    Personal history of other diseases of male genital organs     History of chronic prostatitis    Personal history of other mental and behavioral disorders     History of depression    Psychophysiologic insomnia     Chronic insomnia    Urticaria, unspecified 10/13/2013    Urticaria       PSH:  Past Surgical History:   Procedure Laterality Date    CHOLECYSTECTOMY  02/17/2014    Cholecystectomy    COLON SURGERY  03/26/2014    Colon Surgery    HEMICOLECTOMY  03/26/2014    Hemicolectomy    HERNIA REPAIR  02/17/2014    Hernia Repair    KNEE SURGERY  03/26/2014    Knee Surgery    OTHER SURGICAL HISTORY  02/17/2014    History Of Prior Surgery    TONSILLECTOMY  02/17/2014    Tonsillectomy       FMH:  Family History   Problem Relation Name Age of Onset    Colon cancer Mother      Colonic polyp Father      Depression Father      Prostate cancer Father      Lymphoma Other Parental Uncle     Lymphoma Sibling         SHx:  Social History     Tobacco Use    Smoking status: Never    Smokeless tobacco: Never       Allergies:  No Known Allergies      Assessment/Plan  Discussed his PSA trend, objectively high for his age, but given his prostate vol of 93g and low PSAd, along with his neg MRI last week, it is very reassuring. Will monitor PSA every 6 months.     Follow up in 6 months with PSA prior.    Scribe Attestation  By signing my name below, I, Gabby Arevalo, attest that this documentation has been prepared under the direction and in the presence of Burke Fu MD.

## 2025-01-10 DIAGNOSIS — N40.1 BENIGN PROSTATIC HYPERPLASIA (BPH) WITH POST-VOID DRIBBLING: ICD-10-CM

## 2025-01-10 DIAGNOSIS — N39.43 BENIGN PROSTATIC HYPERPLASIA (BPH) WITH POST-VOID DRIBBLING: ICD-10-CM

## 2025-01-10 DIAGNOSIS — R97.20 ELEVATED PSA: ICD-10-CM

## 2025-01-10 RX ORDER — TADALAFIL 5 MG/1
5 TABLET ORAL DAILY
Qty: 30 TABLET | Refills: 11 | Status: SHIPPED | OUTPATIENT
Start: 2025-01-10 | End: 2026-01-10

## 2025-07-07 LAB — PSA SERPL-MCNC: 4.04 NG/ML

## 2025-07-09 NOTE — PROGRESS NOTES
HPI    60 y.o. male being seen with the following problem list:    Problem list:  Bothersome LUTS  History of UTI  Elevated PSA     Previously seen by Dr. Diez in [place] for weak stream, intermittency     05/02/24 - PVR today 6cc. Was on flomax, then finasteride, didn't tolerate either one. Now not doing too bad from a urinary standpoint. Had a UTI early in 2024. Started finasteride around 2/2024, was on it for about 1 mo     Prostate Cancer Screening:   No family history of prostate cancer, No prior biopsy, and No prior MRI     05/22/24 - seen to review interval PSA. LUTS are fairly ok off meds, does not want to do anything different at this time.     11/22/24 - Seen today over telehealth, performed this visit using real-time telehealth tools, including an audio/video connection between Oswald Burton at home and Burke Fu MD at [facility]. Consent for telemedicine visit was obtained.  Urinary symptoms fluctuate, nocturia 1-2x, feels like he is emptying well. Would like to try daily tadalafil for BPH.      1/6/25 MRI prostate - PiRADS 2 lesion. PSA density is 0.05 ng/mL/g. Prostate vol 93g.     01/08/25 - Seen today over telehealth, performed this visit using real-time telehealth tools, including an audio/video connection between Oswald Burton at home and Burke Fu MD at [facility]. Consent for telemedicine visit was obtained.   Doing well, here to review MRI. Has been experiencing pain/discomfort in his groin and in his testicles. Mild urinary symptoms, not bothersome at this point. Father had PCa in his 70s.     07/10/25 - Strains for first stream in the morning, doing well otherwise. No nocturia.         Lab Results   Component Value Date    PSA 4.04 (H) 07/07/2025    PSA 4.29 (H) 12/27/2024    PSA 4.64 (H) 11/18/2024    PSA 3.4 05/10/2024    PSA 6.1 (H) 03/25/2024              Current Medications:  Current Medications[1]     Active Problems:  Oswald HARDY Micheal is a 60  y.o. male with the following Problems and Medications.  Problem List[2]  Current Medications[3]    PMH:  Medical History[4]    PSH:  Surgical History[5]    FMH:  Family History[6]    SHx:  Social History[7]    Allergies:  RX Allergies[8]      Assessment/Plan  Urinary symptoms stable, no concerns at this point.     PSA is stable, reassuring given his prostate vol of 93g, low PSAd, with neg MRI 1/2025 . Will continue with PCa screening in 6 months.     FU in 6 months over TH with PSA prior     Scribe Attestation  By signing my name below, I, Martha Henning, [place], attest that this documentation has been prepared under the direction and in the presence of Burke Fu MD.           [1]   Current Outpatient Medications   Medication Sig Dispense Refill    atorvastatin (Lipitor) 40 mg tablet Take 1 tablet (40 mg) by mouth 1 (one) time each day.      cholecalciferol (Vitamin D-3) 5,000 Units tablet Vitamin D3 125 MCG (5000 UT) Oral Tablet   Refills: 0        Start : 27-Jul-2022   Active      cyanocobalamin (Vitamin B-12) 100 mcg tablet Vitamin B12 100 MCG Oral Tablet   Refills: 0        Start : 27-Jul-2022   Active      LORazepam (Ativan) 1 mg tablet Take 1 tablet (1 mg) by mouth in the morning and at bedtime. For anxiety      tadalafil (Cialis) 5 mg tablet Take 1 tablet (5 mg) by mouth once daily. 30 tablet 11     No current facility-administered medications for this visit.   [2]   Patient Active Problem List  Diagnosis    Allergic rhinitis due to animals    Abdominal pain, acute    Anxiety    Benign prostatic hyperplasia (BPH) with post-void dribbling    Charcot-Kimberly-Tooth disease    Choking episode    Fatty liver    GERD (gastroesophageal reflux disease)    High triglycerides    Hyperlipidemia    Multiple pulmonary nodules    Recurrent cough    Tick bite    Vitamin B12 deficiency    Vitamin D deficiency    Elevated PSA    History of UTI    Urinary hesitancy   [3]   Current Outpatient Medications   Medication Sig  Dispense Refill    atorvastatin (Lipitor) 40 mg tablet Take 1 tablet (40 mg) by mouth 1 (one) time each day.      cholecalciferol (Vitamin D-3) 5,000 Units tablet Vitamin D3 125 MCG (5000 UT) Oral Tablet   Refills: 0        Start : 27-Jul-2022   Active      cyanocobalamin (Vitamin B-12) 100 mcg tablet Vitamin B12 100 MCG Oral Tablet   Refills: 0        Start : 27-Jul-2022   Active      LORazepam (Ativan) 1 mg tablet Take 1 tablet (1 mg) by mouth in the morning and at bedtime. For anxiety      tadalafil (Cialis) 5 mg tablet Take 1 tablet (5 mg) by mouth once daily. 30 tablet 11     No current facility-administered medications for this visit.   [4]   Past Medical History:  Diagnosis Date    Acquired absence of other specified parts of digestive tract     History of colon resection    Benign prostatic hyperplasia     Elevated PSA     Kidney stone     Low back pain, unspecified 10/13/2013    Lumbago    Mixed hyperlipidemia 03/12/2014    Mixed hyperlipidemia    Other specified postprocedural states     History of colonoscopy    Personal history of other diseases of male genital organs     History of chronic prostatitis    Personal history of other mental and behavioral disorders     History of depression    Psychophysiologic insomnia     Chronic insomnia    Urinary tract infection     Urticaria, unspecified 10/13/2013    Urticaria   [5]   Past Surgical History:  Procedure Laterality Date    CHOLECYSTECTOMY  02/17/2014    Cholecystectomy    COLON SURGERY  03/26/2014    Colon Surgery    HEMICOLECTOMY  03/26/2014    Hemicolectomy    HERNIA REPAIR  02/17/2014    Hernia Repair    KNEE SURGERY  03/26/2014    Knee Surgery    OTHER SURGICAL HISTORY  02/17/2014    History Of Prior Surgery    TONSILLECTOMY  02/17/2014    Tonsillectomy   [6]   Family History  Problem Relation Name Age of Onset    Colon cancer Mother      Colonic polyp Father      Depression Father      Prostate cancer Father      Lymphoma Other Parental Uncle      Lymphoma Sibling      Kidney cancer Father Jj Burton 60 - 69    Urinary tract infection Father Jj Burton 80 - 99    Cancer Sibling Maru 60 - 69    Cancer Other Nephew 20 - 29   [7]   Social History  Tobacco Use    Smoking status: Never    Smokeless tobacco: Never   Substance Use Topics    Alcohol use: Not Currently   [8] No Known Allergies

## 2025-07-10 ENCOUNTER — OFFICE VISIT (OUTPATIENT)
Dept: UROLOGY | Facility: HOSPITAL | Age: 60
End: 2025-07-10
Payer: COMMERCIAL

## 2025-07-10 DIAGNOSIS — R39.11 URINARY HESITANCY: Primary | ICD-10-CM

## 2025-07-10 DIAGNOSIS — R97.20 ELEVATED PSA: ICD-10-CM

## 2025-07-10 DIAGNOSIS — N40.1 BENIGN PROSTATIC HYPERPLASIA (BPH) WITH POST-VOID DRIBBLING: ICD-10-CM

## 2025-07-10 DIAGNOSIS — N39.43 BENIGN PROSTATIC HYPERPLASIA (BPH) WITH POST-VOID DRIBBLING: ICD-10-CM

## 2025-07-10 DIAGNOSIS — R39.9 LOWER URINARY TRACT SYMPTOMS (LUTS): ICD-10-CM

## 2025-07-10 PROCEDURE — 99213 OFFICE O/P EST LOW 20 MIN: CPT | Performed by: UROLOGY

## 2025-07-28 ENCOUNTER — TELEPHONE (OUTPATIENT)
Dept: CARDIOLOGY | Facility: HOSPITAL | Age: 60
End: 2025-07-28
Payer: COMMERCIAL

## 2025-07-28 NOTE — TELEPHONE ENCOUNTER
Instructions for exercise stress echo    Spoke to: Oswald Burton   Test: exercise stress echo    Please arrive at 10:30 to registration B  The test takes about 1 hour to complete;   Wear comfortable clothing and if you are exercising wear appropriate comfortable shoes  No food 4 hours before your test, water is ok  No caffeine the day of your test  6.   You may take all your other medications, but we recommend you hold any water pills  7.   Please do not use nicotine replacement or smoke 4 hours prior to test,

## 2025-07-29 ENCOUNTER — HOSPITAL ENCOUNTER (OUTPATIENT)
Dept: CARDIOLOGY | Facility: HOSPITAL | Age: 60
Discharge: HOME | End: 2025-07-29
Payer: COMMERCIAL

## 2025-07-29 DIAGNOSIS — R07.9 CHEST PAIN, UNSPECIFIED: ICD-10-CM

## 2025-07-29 PROCEDURE — 93321 DOPPLER ECHO F-UP/LMTD STD: CPT

## 2025-07-29 PROCEDURE — 93325 DOPPLER ECHO COLOR FLOW MAPG: CPT

## 2025-07-29 PROCEDURE — 93016 CV STRESS TEST SUPVJ ONLY: CPT

## 2025-07-29 PROCEDURE — 93350 STRESS TTE ONLY: CPT

## 2025-07-29 PROCEDURE — 93018 CV STRESS TEST I&R ONLY: CPT
